# Patient Record
Sex: MALE | Race: WHITE | NOT HISPANIC OR LATINO | URBAN - METROPOLITAN AREA
[De-identification: names, ages, dates, MRNs, and addresses within clinical notes are randomized per-mention and may not be internally consistent; named-entity substitution may affect disease eponyms.]

---

## 2023-12-13 DIAGNOSIS — I10 PRIMARY HYPERTENSION: ICD-10-CM

## 2023-12-13 DIAGNOSIS — E78.00 HYPERCHOLESTEROLEMIA: ICD-10-CM

## 2023-12-13 PROBLEM — K21.9 GERD (GASTROESOPHAGEAL REFLUX DISEASE): Status: ACTIVE | Noted: 2023-12-13

## 2023-12-13 PROBLEM — M25.569 KNEE PAIN: Status: ACTIVE | Noted: 2023-12-13

## 2023-12-13 PROBLEM — R21 RASH: Status: ACTIVE | Noted: 2023-12-13

## 2023-12-13 PROBLEM — I25.10 CORONARY ARTERIOSCLEROSIS: Status: ACTIVE | Noted: 2023-12-13

## 2023-12-13 PROBLEM — E55.9 VITAMIN D DEFICIENCY: Status: ACTIVE | Noted: 2023-12-13

## 2023-12-13 PROBLEM — R93.1 ABNORMAL HEART SCORE CT: Status: ACTIVE | Noted: 2023-12-13

## 2023-12-13 RX ORDER — ASPIRIN 81 MG/1
1 TABLET ORAL DAILY
COMMUNITY
Start: 2022-04-13

## 2023-12-13 RX ORDER — EVOLOCUMAB 140 MG/ML
INJECTION, SOLUTION SUBCUTANEOUS
COMMUNITY
Start: 2022-04-13 | End: 2024-01-10 | Stop reason: WASHOUT

## 2023-12-13 RX ORDER — CHOLECALCIFEROL (VITAMIN D3) 125 MCG
1 CAPSULE ORAL DAILY
COMMUNITY
Start: 2018-12-20 | End: 2024-01-10 | Stop reason: WASHOUT

## 2023-12-13 RX ORDER — OMEPRAZOLE 20 MG/1
1 CAPSULE, DELAYED RELEASE ORAL DAILY
COMMUNITY
Start: 2014-11-18 | End: 2024-01-10 | Stop reason: WASHOUT

## 2023-12-13 RX ORDER — ROSUVASTATIN CALCIUM 40 MG/1
40 TABLET, COATED ORAL DAILY
COMMUNITY

## 2023-12-30 ENCOUNTER — LAB (OUTPATIENT)
Dept: LAB | Facility: LAB | Age: 63
End: 2023-12-30
Payer: MEDICARE

## 2023-12-30 DIAGNOSIS — E78.00 HYPERCHOLESTEROLEMIA: ICD-10-CM

## 2023-12-30 DIAGNOSIS — I10 PRIMARY HYPERTENSION: ICD-10-CM

## 2023-12-30 LAB
ALBUMIN SERPL BCP-MCNC: 4.5 G/DL (ref 3.4–5)
ALP SERPL-CCNC: 93 U/L (ref 33–136)
ALT SERPL W P-5'-P-CCNC: 17 U/L (ref 10–52)
ANION GAP SERPL CALC-SCNC: 11 MMOL/L (ref 10–20)
APPEARANCE UR: ABNORMAL
AST SERPL W P-5'-P-CCNC: 15 U/L (ref 9–39)
BASOPHILS # BLD AUTO: 0.03 X10*3/UL (ref 0–0.1)
BASOPHILS NFR BLD AUTO: 0.5 %
BILIRUB SERPL-MCNC: 0.5 MG/DL (ref 0–1.2)
BILIRUB UR STRIP.AUTO-MCNC: NEGATIVE MG/DL
BUN SERPL-MCNC: 18 MG/DL (ref 6–23)
CALCIUM SERPL-MCNC: 10 MG/DL (ref 8.6–10.6)
CHLORIDE SERPL-SCNC: 105 MMOL/L (ref 98–107)
CHOLEST SERPL-MCNC: 193 MG/DL (ref 0–199)
CHOLESTEROL/HDL RATIO: 3.4
CO2 SERPL-SCNC: 30 MMOL/L (ref 21–32)
COLOR UR: ABNORMAL
CREAT SERPL-MCNC: 1.2 MG/DL (ref 0.5–1.3)
EOSINOPHIL # BLD AUTO: 0.12 X10*3/UL (ref 0–0.7)
EOSINOPHIL NFR BLD AUTO: 1.8 %
ERYTHROCYTE [DISTWIDTH] IN BLOOD BY AUTOMATED COUNT: 12.7 % (ref 11.5–14.5)
GFR SERPL CREATININE-BSD FRML MDRD: 67 ML/MIN/1.73M*2
GLUCOSE SERPL-MCNC: 100 MG/DL (ref 74–99)
GLUCOSE UR STRIP.AUTO-MCNC: NEGATIVE MG/DL
HCT VFR BLD AUTO: 45.5 % (ref 41–52)
HDLC SERPL-MCNC: 56.4 MG/DL
HGB BLD-MCNC: 15.1 G/DL (ref 13.5–17.5)
IMM GRANULOCYTES # BLD AUTO: 0.01 X10*3/UL (ref 0–0.7)
IMM GRANULOCYTES NFR BLD AUTO: 0.2 % (ref 0–0.9)
KETONES UR STRIP.AUTO-MCNC: NEGATIVE MG/DL
LDLC SERPL CALC-MCNC: 100 MG/DL
LEUKOCYTE ESTERASE UR QL STRIP.AUTO: NEGATIVE
LYMPHOCYTES # BLD AUTO: 1.33 X10*3/UL (ref 1.2–4.8)
LYMPHOCYTES NFR BLD AUTO: 20.2 %
MCH RBC QN AUTO: 29.7 PG (ref 26–34)
MCHC RBC AUTO-ENTMCNC: 33.2 G/DL (ref 32–36)
MCV RBC AUTO: 89 FL (ref 80–100)
MONOCYTES # BLD AUTO: 0.61 X10*3/UL (ref 0.1–1)
MONOCYTES NFR BLD AUTO: 9.3 %
NEUTROPHILS # BLD AUTO: 4.47 X10*3/UL (ref 1.2–7.7)
NEUTROPHILS NFR BLD AUTO: 68 %
NITRITE UR QL STRIP.AUTO: NEGATIVE
NON HDL CHOLESTEROL: 137 MG/DL (ref 0–149)
NRBC BLD-RTO: 0 /100 WBCS (ref 0–0)
PH UR STRIP.AUTO: 5 [PH]
PLATELET # BLD AUTO: 219 X10*3/UL (ref 150–450)
POTASSIUM SERPL-SCNC: 4.4 MMOL/L (ref 3.5–5.3)
PROT SERPL-MCNC: 7.4 G/DL (ref 6.4–8.2)
PROT UR STRIP.AUTO-MCNC: NEGATIVE MG/DL
RBC # BLD AUTO: 5.09 X10*6/UL (ref 4.5–5.9)
RBC # UR STRIP.AUTO: NEGATIVE /UL
SODIUM SERPL-SCNC: 142 MMOL/L (ref 136–145)
SP GR UR STRIP.AUTO: 1.02
TRIGL SERPL-MCNC: 184 MG/DL (ref 0–149)
TSH SERPL-ACNC: 1.07 MIU/L (ref 0.44–3.98)
UROBILINOGEN UR STRIP.AUTO-MCNC: <2 MG/DL
VLDL: 37 MG/DL (ref 0–40)
WBC # BLD AUTO: 6.6 X10*3/UL (ref 4.4–11.3)

## 2023-12-30 PROCEDURE — 36415 COLL VENOUS BLD VENIPUNCTURE: CPT

## 2023-12-30 PROCEDURE — 80061 LIPID PANEL: CPT

## 2023-12-30 PROCEDURE — 81003 URINALYSIS AUTO W/O SCOPE: CPT

## 2023-12-30 PROCEDURE — 85025 COMPLETE CBC W/AUTO DIFF WBC: CPT

## 2023-12-30 PROCEDURE — 84443 ASSAY THYROID STIM HORMONE: CPT

## 2023-12-30 PROCEDURE — 80053 COMPREHEN METABOLIC PANEL: CPT

## 2024-01-02 ENCOUNTER — TELEPHONE (OUTPATIENT)
Dept: PRIMARY CARE | Facility: CLINIC | Age: 64
End: 2024-01-02
Payer: MEDICARE

## 2024-01-10 ENCOUNTER — OFFICE VISIT (OUTPATIENT)
Dept: PRIMARY CARE | Facility: CLINIC | Age: 64
End: 2024-01-10
Payer: MEDICARE

## 2024-01-10 VITALS
HEIGHT: 69 IN | SYSTOLIC BLOOD PRESSURE: 174 MMHG | DIASTOLIC BLOOD PRESSURE: 84 MMHG | WEIGHT: 211 LBS | BODY MASS INDEX: 31.25 KG/M2

## 2024-01-10 DIAGNOSIS — I10 PRIMARY HYPERTENSION: ICD-10-CM

## 2024-01-10 DIAGNOSIS — Z12.5 ENCOUNTER FOR PROSTATE CANCER SCREENING: ICD-10-CM

## 2024-01-10 DIAGNOSIS — D49.2 SKIN GROWTH: ICD-10-CM

## 2024-01-10 DIAGNOSIS — R07.9 CHEST PAIN, UNSPECIFIED TYPE: Primary | ICD-10-CM

## 2024-01-10 DIAGNOSIS — R94.31 ABNORMAL EKG: ICD-10-CM

## 2024-01-10 PROCEDURE — 3079F DIAST BP 80-89 MM HG: CPT | Performed by: INTERNAL MEDICINE

## 2024-01-10 PROCEDURE — 93000 ELECTROCARDIOGRAM COMPLETE: CPT | Performed by: INTERNAL MEDICINE

## 2024-01-10 PROCEDURE — 3077F SYST BP >= 140 MM HG: CPT | Performed by: INTERNAL MEDICINE

## 2024-01-10 PROCEDURE — 99214 OFFICE O/P EST MOD 30 MIN: CPT | Performed by: INTERNAL MEDICINE

## 2024-01-10 PROCEDURE — 1126F AMNT PAIN NOTED NONE PRSNT: CPT | Performed by: INTERNAL MEDICINE

## 2024-01-10 PROCEDURE — 1159F MED LIST DOCD IN RCRD: CPT | Performed by: INTERNAL MEDICINE

## 2024-01-10 RX ORDER — METOPROLOL SUCCINATE 25 MG/1
25 TABLET, EXTENDED RELEASE ORAL DAILY
Qty: 30 TABLET | Refills: 0 | Status: SHIPPED | OUTPATIENT
Start: 2024-01-10 | End: 2024-02-07 | Stop reason: SDUPTHER

## 2024-01-10 RX ORDER — LOSARTAN POTASSIUM 50 MG/1
50 TABLET ORAL DAILY
Qty: 30 TABLET | Refills: 0 | Status: SHIPPED | OUTPATIENT
Start: 2024-01-10 | End: 2024-02-07 | Stop reason: SDUPTHER

## 2024-01-11 NOTE — PROGRESS NOTES
"Subjective   Patient ID: Jerrell Ballard is a 63 y.o. male who presents for Follow-up (multiple medical issues).    This gentleman today came here for multiple medical issues.  1. He is under a lot of stress.  He is getting chest pain, dyspnea on exertion, easy fatigability.  Blood pressure is high.  2. He noticed a growth on his scrotal sac, he is worried about.  There is no question of sexually transmitted disease.  He wants to get tested.    I have personally reviewed the patient's Past Medical History, Medications, Allergies, Social History, and Family History in the EMR.    Review of Systems   All other systems reviewed and are negative.    Objective   /84   Ht 1.753 m (5' 9\")   Wt 95.7 kg (211 lb)   BMI 31.16 kg/m²     Physical Exam  Vitals reviewed.   Cardiovascular:      Heart sounds: Normal heart sounds, S1 normal and S2 normal. No murmur heard.     No friction rub.   Pulmonary:      Effort: Pulmonary effort is normal.      Breath sounds: Normal breath sounds and air entry.   Abdominal:      Palpations: There is no hepatomegaly, splenomegaly or mass.   Genitourinary:     Comments: Genital herpes on his scrotal sac, a small pedunculated growth looks like a benign wart.  Musculoskeletal:      Right lower leg: No edema.      Left lower leg: No edema.   Lymphadenopathy:      Lower Body: No right inguinal adenopathy. No left inguinal adenopathy.   Neurological:      Cranial Nerves: Cranial nerves 2-12 are intact.      Sensory: No sensory deficit.      Motor: Motor function is intact.      Deep Tendon Reflexes: Reflexes are normal and symmetric.     LAB WORK:  Laboratory testing discussed.    Assessment/Plan   Problem List Items Addressed This Visit             ICD-10-CM       Cardiac and Vasculature    Hypertension I10    Relevant Medications    metoprolol succinate XL (Toprol-XL) 25 mg 24 hr tablet    losartan (Cozaar) 50 mg tablet    Other Relevant Orders    ECG 12 Lead    Basic metabolic panel    " Referral to Cardiology     Other Visit Diagnoses         Codes    Chest pain, unspecified type    -  Primary R07.9    Abnormal EKG     R94.31    Relevant Orders    Referral to Cardiology    Encounter for prostate cancer screening     Z12.5    Relevant Orders    Prostate Specific Antigen, Screen    Skin growth     D49.2          1. Chest pain, dyspnea on exertion, hypertension, abnormal EKG.  Refer to Cardiology.  He has a normal stress tests 18 months ago.  2. High blood pressure, systolic is very high.  I will start him on metoprolol succinate 25 mg in the morning and losartan 50 mg in the evening.  We will check kidney function, see him in two weeks.  3. Growth.  I have referred him to [facility] to take it out.  4. Abnormal EKG.  Calcium score was low high.  Last year he saw Cardiology.  Refer to Cardiology for possibility of heart checkup and heart catheter in light of blood pressure.  5. High cholesterol, stable.  6. Prostate health.  PSA not done.  We will do next time.  7. The patient just got Medicare.  He is due for Medicare Wellness Visit.  We will try to do it next time.    Scribe Attestation  By signing my name below, IDesi Scribe attest that this documentation has been prepared under the direction and in the presence of Jennifer Randhawa MD.

## 2024-01-27 ENCOUNTER — LAB (OUTPATIENT)
Dept: LAB | Facility: LAB | Age: 64
End: 2024-01-27
Payer: MEDICARE

## 2024-01-27 DIAGNOSIS — Z12.5 ENCOUNTER FOR PROSTATE CANCER SCREENING: ICD-10-CM

## 2024-01-27 DIAGNOSIS — I10 PRIMARY HYPERTENSION: ICD-10-CM

## 2024-01-27 LAB
ANION GAP SERPL CALC-SCNC: 14 MMOL/L (ref 10–20)
BUN SERPL-MCNC: 22 MG/DL (ref 6–23)
CALCIUM SERPL-MCNC: 9.8 MG/DL (ref 8.6–10.6)
CHLORIDE SERPL-SCNC: 103 MMOL/L (ref 98–107)
CO2 SERPL-SCNC: 27 MMOL/L (ref 21–32)
CREAT SERPL-MCNC: 1.14 MG/DL (ref 0.5–1.3)
EGFRCR SERPLBLD CKD-EPI 2021: 71 ML/MIN/1.73M*2
GLUCOSE SERPL-MCNC: 102 MG/DL (ref 74–99)
POTASSIUM SERPL-SCNC: 4.6 MMOL/L (ref 3.5–5.3)
PSA SERPL-MCNC: 0.47 NG/ML
SODIUM SERPL-SCNC: 139 MMOL/L (ref 136–145)

## 2024-01-27 PROCEDURE — G0103 PSA SCREENING: HCPCS

## 2024-01-27 PROCEDURE — 80048 BASIC METABOLIC PNL TOTAL CA: CPT

## 2024-01-27 PROCEDURE — 36415 COLL VENOUS BLD VENIPUNCTURE: CPT

## 2024-02-07 ENCOUNTER — OFFICE VISIT (OUTPATIENT)
Dept: CARDIOLOGY | Facility: CLINIC | Age: 64
End: 2024-02-07
Payer: MEDICARE

## 2024-02-07 ENCOUNTER — HOSPITAL ENCOUNTER (OUTPATIENT)
Dept: CARDIOLOGY | Facility: CLINIC | Age: 64
Discharge: HOME | End: 2024-02-07
Payer: MEDICARE

## 2024-02-07 VITALS
WEIGHT: 208.9 LBS | OXYGEN SATURATION: 97 % | SYSTOLIC BLOOD PRESSURE: 130 MMHG | DIASTOLIC BLOOD PRESSURE: 70 MMHG | HEIGHT: 69 IN | BODY MASS INDEX: 30.94 KG/M2 | HEART RATE: 68 BPM

## 2024-02-07 DIAGNOSIS — I25.10 CORONARY ARTERIOSCLEROSIS: ICD-10-CM

## 2024-02-07 DIAGNOSIS — I10 PRIMARY HYPERTENSION: ICD-10-CM

## 2024-02-07 DIAGNOSIS — E78.00 HYPERCHOLESTEROLEMIA: Primary | ICD-10-CM

## 2024-02-07 PROCEDURE — 99214 OFFICE O/P EST MOD 30 MIN: CPT | Performed by: INTERNAL MEDICINE

## 2024-02-07 PROCEDURE — 1159F MED LIST DOCD IN RCRD: CPT | Performed by: INTERNAL MEDICINE

## 2024-02-07 PROCEDURE — 93005 ELECTROCARDIOGRAM TRACING: CPT

## 2024-02-07 PROCEDURE — 93010 ELECTROCARDIOGRAM REPORT: CPT | Performed by: INTERNAL MEDICINE

## 2024-02-07 PROCEDURE — 3078F DIAST BP <80 MM HG: CPT | Performed by: INTERNAL MEDICINE

## 2024-02-07 PROCEDURE — 3075F SYST BP GE 130 - 139MM HG: CPT | Performed by: INTERNAL MEDICINE

## 2024-02-07 PROCEDURE — 1126F AMNT PAIN NOTED NONE PRSNT: CPT | Performed by: INTERNAL MEDICINE

## 2024-02-07 RX ORDER — EZETIMIBE 10 MG/1
10 TABLET ORAL DAILY
Qty: 90 TABLET | Refills: 3 | Status: SHIPPED | OUTPATIENT
Start: 2024-02-07 | End: 2025-02-06

## 2024-02-07 RX ORDER — METOPROLOL SUCCINATE 50 MG/1
50 TABLET, EXTENDED RELEASE ORAL DAILY
Qty: 90 TABLET | Refills: 3 | Status: SHIPPED | OUTPATIENT
Start: 2024-02-07 | End: 2025-02-01

## 2024-02-07 RX ORDER — LOSARTAN POTASSIUM 50 MG/1
50 TABLET ORAL DAILY
Qty: 90 TABLET | Refills: 3 | Status: SHIPPED | OUTPATIENT
Start: 2024-02-07 | End: 2025-02-06

## 2024-02-07 ASSESSMENT — ENCOUNTER SYMPTOMS
LOSS OF SENSATION IN FEET: 0
OCCASIONAL FEELINGS OF UNSTEADINESS: 0
DEPRESSION: 0

## 2024-02-07 ASSESSMENT — PAIN SCALES - GENERAL: PAINLEVEL: 0-NO PAIN

## 2024-02-08 LAB
ATRIAL RATE: 68 BPM
P AXIS: 25 DEGREES
P OFFSET: 200 MS
P ONSET: 137 MS
PR INTERVAL: 146 MS
Q ONSET: 210 MS
QRS COUNT: 11 BEATS
QRS DURATION: 140 MS
QT INTERVAL: 406 MS
QTC CALCULATION(BAZETT): 431 MS
QTC FREDERICIA: 423 MS
R AXIS: -30 DEGREES
T AXIS: 14 DEGREES
T OFFSET: 413 MS
VENTRICULAR RATE: 68 BPM

## 2024-02-11 NOTE — PROGRESS NOTES
Subjective   Jerrell Ballard is a 63 y.o. male.    Chief Complaint:  New Patient Visit    HPI    ROS    Objective   Physical Exam    Lab Review:   Lab on 01/27/2024   Component Date Value    Glucose 01/27/2024 102 (H)     Sodium 01/27/2024 139     Potassium 01/27/2024 4.6     Chloride 01/27/2024 103     Bicarbonate 01/27/2024 27     Anion Gap 01/27/2024 14     Urea Nitrogen 01/27/2024 22     Creatinine 01/27/2024 1.14     eGFR 01/27/2024 71     Calcium 01/27/2024 9.8     Prostate Specific Antige* 01/27/2024 0.47    Lab on 12/30/2023   Component Date Value    Glucose 12/30/2023 100 (H)     Sodium 12/30/2023 142     Potassium 12/30/2023 4.4     Chloride 12/30/2023 105     Bicarbonate 12/30/2023 30     Anion Gap 12/30/2023 11     Urea Nitrogen 12/30/2023 18     Creatinine 12/30/2023 1.20     eGFR 12/30/2023 67     Calcium 12/30/2023 10.0     Albumin 12/30/2023 4.5     Alkaline Phosphatase 12/30/2023 93     Total Protein 12/30/2023 7.4     AST 12/30/2023 15     Bilirubin, Total 12/30/2023 0.5     ALT 12/30/2023 17     WBC 12/30/2023 6.6     nRBC 12/30/2023 0.0     RBC 12/30/2023 5.09     Hemoglobin 12/30/2023 15.1     Hematocrit 12/30/2023 45.5     MCV 12/30/2023 89     MCH 12/30/2023 29.7     MCHC 12/30/2023 33.2     RDW 12/30/2023 12.7     Platelets 12/30/2023 219     Neutrophils % 12/30/2023 68.0     Immature Granulocytes %,* 12/30/2023 0.2     Lymphocytes % 12/30/2023 20.2     Monocytes % 12/30/2023 9.3     Eosinophils % 12/30/2023 1.8     Basophils % 12/30/2023 0.5     Neutrophils Absolute 12/30/2023 4.47     Immature Granulocytes Ab* 12/30/2023 0.01     Lymphocytes Absolute 12/30/2023 1.33     Monocytes Absolute 12/30/2023 0.61     Eosinophils Absolute 12/30/2023 0.12     Basophils Absolute 12/30/2023 0.03     Color, Urine 12/30/2023 Red (N)     Appearance, Urine 12/30/2023 Hazy (N)     Specific Gravity, Urine 12/30/2023 1.019     pH, Urine 12/30/2023 5.0     Protein, Urine 12/30/2023 NEGATIVE     Glucose, Urine  12/30/2023 NEGATIVE     Blood, Urine 12/30/2023 NEGATIVE     Ketones, Urine 12/30/2023 NEGATIVE     Bilirubin, Urine 12/30/2023 NEGATIVE     Urobilinogen, Urine 12/30/2023 <2.0     Nitrite, Urine 12/30/2023 NEGATIVE     Leukocyte Esterase, Urine 12/30/2023 NEGATIVE     Thyroid Stimulating Horm* 12/30/2023 1.07     Cholesterol 12/30/2023 193     HDL-Cholesterol 12/30/2023 56.4     Cholesterol/HDL Ratio 12/30/2023 3.4     LDL Calculated 12/30/2023 100 (H)     VLDL 12/30/2023 37     Triglycerides 12/30/2023 184 (H)     Non HDL Cholesterol 12/30/2023 137        Assessment/Plan   The primary encounter diagnosis was Hypercholesterolemia. Diagnoses of Coronary arteriosclerosis and Primary hypertension were also pertinent to this visit.    1.  Coronary artery disease.  This patient had a CT coronary calcium score of 377 when performed on 2/3/2022.  A subsequent stress echocardiogram was performed on 6/3/2022 which was unremarkable.  He is clinically asymptomatic.  EKG today's demonstrates sinus rhythm left axis deviation right bundle branch block conduction delay.  Patient will return in 9 months.  His metoprolol ER will be increased from 25 to 50 mg daily and he will remain on losartan 50 mg daily.    2.  Marked hyperlipidemia.  This patient had a total cholesterol 335 with an LDL of 239 when checked 5 years ago.  More recent lipid panel on 12/30/2023 on rosuvastatin 40 mg daily included cholesterol 193  HDL 56.  Will add Zetia 10 mg daily to the rosuvastatin 40 mg daily.    3.  Right bundle branch block conduction delay.

## 2024-02-12 ENCOUNTER — OFFICE VISIT (OUTPATIENT)
Dept: PRIMARY CARE | Facility: CLINIC | Age: 64
End: 2024-02-12
Payer: MEDICARE

## 2024-02-12 VITALS
WEIGHT: 210 LBS | DIASTOLIC BLOOD PRESSURE: 72 MMHG | HEIGHT: 69 IN | BODY MASS INDEX: 31.1 KG/M2 | SYSTOLIC BLOOD PRESSURE: 136 MMHG

## 2024-02-12 DIAGNOSIS — E78.00 HIGH CHOLESTEROL: ICD-10-CM

## 2024-02-12 DIAGNOSIS — R00.2 PALPITATION: Primary | ICD-10-CM

## 2024-02-12 DIAGNOSIS — Z23 NEED FOR PNEUMOCOCCAL VACCINE: ICD-10-CM

## 2024-02-12 DIAGNOSIS — Z13.9 ENCOUNTER FOR SCREENING: ICD-10-CM

## 2024-02-12 DIAGNOSIS — I10 HYPERTENSION, UNSPECIFIED TYPE: ICD-10-CM

## 2024-02-12 PROCEDURE — 1126F AMNT PAIN NOTED NONE PRSNT: CPT | Performed by: INTERNAL MEDICINE

## 2024-02-12 PROCEDURE — G0402 INITIAL PREVENTIVE EXAM: HCPCS | Performed by: INTERNAL MEDICINE

## 2024-02-12 PROCEDURE — 99213 OFFICE O/P EST LOW 20 MIN: CPT | Performed by: INTERNAL MEDICINE

## 2024-02-12 PROCEDURE — 1159F MED LIST DOCD IN RCRD: CPT | Performed by: INTERNAL MEDICINE

## 2024-02-12 PROCEDURE — 1160F RVW MEDS BY RX/DR IN RCRD: CPT | Performed by: INTERNAL MEDICINE

## 2024-02-12 PROCEDURE — 1170F FXNL STATUS ASSESSED: CPT | Performed by: INTERNAL MEDICINE

## 2024-02-12 PROCEDURE — 3075F SYST BP GE 130 - 139MM HG: CPT | Performed by: INTERNAL MEDICINE

## 2024-02-12 PROCEDURE — 3078F DIAST BP <80 MM HG: CPT | Performed by: INTERNAL MEDICINE

## 2024-02-12 ASSESSMENT — ACTIVITIES OF DAILY LIVING (ADL)
BATHING: INDEPENDENT
MANAGING_FINANCES: INDEPENDENT
GROCERY_SHOPPING: INDEPENDENT
TAKING_MEDICATION: INDEPENDENT
DRESSING: INDEPENDENT
DOING_HOUSEWORK: INDEPENDENT

## 2024-02-12 ASSESSMENT — ENCOUNTER SYMPTOMS
DEPRESSION: 0
OCCASIONAL FEELINGS OF UNSTEADINESS: 0
LOSS OF SENSATION IN FEET: 0

## 2024-02-13 NOTE — PROGRESS NOTES
"Subjective   Patient ID: Jerrell Ballard is a 63 y.o. male who presents for Welcome To Medicare and Follow-up (blood work and cardiac).    Mr. Ballard today came here.  1. Welcome to Medicare exam.  2. Follow-up on blood work and cardiac.  He saw cardiologist, put him on metoprolol, it did help him.  Overall, okay.  No problem.    IMMUNIZATION:  He has pneumonia and shingles shot.    I have personally reviewed the patient's Past Medical History, Medications, Allergies, Social History, and Family History in the EMR.    Review of Systems   All other systems reviewed and are negative.  The patient has never had a stroke.  No heart attack.  No diabetes.  No cancer.    Objective   /72   Ht 1.753 m (5' 9\")   Wt 95.3 kg (210 lb)   BMI 31.01 kg/m²     Physical Exam  Vitals reviewed.   HENT:      Right Ear: Tympanic membrane, ear canal and external ear normal.      Left Ear: Tympanic membrane, ear canal and external ear normal.   Eyes:      General: No scleral icterus.     Pupils: Pupils are equal, round, and reactive to light.   Neck:      Vascular: No carotid bruit.   Cardiovascular:      Heart sounds: Normal heart sounds, S1 normal and S2 normal. No murmur heard.     No friction rub.   Pulmonary:      Effort: Pulmonary effort is normal.      Breath sounds: Normal breath sounds and air entry.   Abdominal:      Palpations: There is no hepatomegaly, splenomegaly or mass.   Genitourinary:     Prostate: Normal.   Musculoskeletal:         General: No swelling or deformity. Normal range of motion.      Cervical back: Neck supple.      Right lower leg: No edema.      Left lower leg: No edema.   Lymphadenopathy:      Cervical: No cervical adenopathy.      Upper Body:      Right upper body: No axillary adenopathy.      Left upper body: No axillary adenopathy.      Lower Body: No right inguinal adenopathy. No left inguinal adenopathy.   Neurological:      Mental Status: He is oriented to person, place, and time.      Cranial " Nerves: Cranial nerves 2-12 are intact. No cranial nerve deficit.      Sensory: No sensory deficit.      Motor: Motor function is intact. No weakness.      Gait: Gait is intact.      Deep Tendon Reflexes: Reflexes normal.   Psychiatric:         Mood and Affect: Mood normal. Mood is not anxious or depressed. Affect is not angry.         Behavior: Behavior is not agitated.         Thought Content: Thought content normal.         Judgment: Judgment normal.     LAB WORK:  Laboratory testing discussed.    Assessment/Plan   Problem List Items Addressed This Visit             ICD-10-CM       Cardiac and Vasculature    Hypertension I10     Other Visit Diagnoses         Codes    Palpitation    -  Primary R00.2    Encounter for screening     Z13.9    Relevant Orders    Vascular US abdominal aorta anuerysm AAA screening    High cholesterol     E78.00    Need for pneumococcal vaccine     Z23        1. Welcome to Medicare exam done.  2. AAA screening ordered.  3. The patient is full code. The patient is not depressed, no suicidal.  4. Wife is a power of .  5. Palpitation.  Monitor EKG.  Metoprolol helping.  6. Hypertension, okay.  7. High cholesterol, stable.  Monitor.  8. I shall see him for routine check in three months.  9. Pneumonia shot given.  Shingles shot arranged.    Scribe Attestation  By signing my name below, I, Desi Rojas, Scribe attest that this documentation has been prepared under the direction and in the presence of Jennifer Randhawa MD.

## 2024-02-15 ENCOUNTER — APPOINTMENT (OUTPATIENT)
Dept: VASCULAR MEDICINE | Facility: CLINIC | Age: 64
End: 2024-02-15
Payer: MEDICARE

## 2024-02-19 ENCOUNTER — HOSPITAL ENCOUNTER (OUTPATIENT)
Dept: VASCULAR MEDICINE | Facility: CLINIC | Age: 64
Discharge: HOME | End: 2024-02-19
Payer: MEDICARE

## 2024-02-19 DIAGNOSIS — Z13.6 ENCOUNTER FOR SCREENING FOR CARDIOVASCULAR DISORDERS: ICD-10-CM

## 2024-02-19 DIAGNOSIS — Z13.9 ENCOUNTER FOR SCREENING: ICD-10-CM

## 2024-02-19 PROCEDURE — 76706 US ABDL AORTA SCREEN AAA: CPT

## 2024-02-19 PROCEDURE — 76706 US ABDL AORTA SCREEN AAA: CPT | Performed by: SURGERY

## 2024-04-02 ENCOUNTER — APPOINTMENT (OUTPATIENT)
Dept: CARDIOLOGY | Facility: CLINIC | Age: 64
End: 2024-04-02
Payer: MEDICARE

## 2024-08-28 DIAGNOSIS — E78.00 HYPERCHOLESTEROLEMIA: Primary | ICD-10-CM

## 2024-08-28 RX ORDER — ROSUVASTATIN CALCIUM 40 MG/1
40 TABLET, COATED ORAL DAILY
Qty: 90 TABLET | Refills: 3 | Status: SHIPPED | OUTPATIENT
Start: 2024-08-28 | End: 2025-08-28

## 2024-11-12 ENCOUNTER — OFFICE VISIT (OUTPATIENT)
Dept: CARDIOLOGY | Facility: CLINIC | Age: 64
End: 2024-11-12
Payer: MEDICARE

## 2024-11-12 VITALS
OXYGEN SATURATION: 100 % | WEIGHT: 213.2 LBS | HEART RATE: 64 BPM | BODY MASS INDEX: 31.58 KG/M2 | DIASTOLIC BLOOD PRESSURE: 64 MMHG | SYSTOLIC BLOOD PRESSURE: 128 MMHG | HEIGHT: 69 IN

## 2024-11-12 DIAGNOSIS — E78.00 HYPERCHOLESTEROLEMIA: ICD-10-CM

## 2024-11-12 PROCEDURE — 1126F AMNT PAIN NOTED NONE PRSNT: CPT | Performed by: INTERNAL MEDICINE

## 2024-11-12 PROCEDURE — 99214 OFFICE O/P EST MOD 30 MIN: CPT | Performed by: INTERNAL MEDICINE

## 2024-11-12 PROCEDURE — 3074F SYST BP LT 130 MM HG: CPT | Performed by: INTERNAL MEDICINE

## 2024-11-12 PROCEDURE — 1159F MED LIST DOCD IN RCRD: CPT | Performed by: INTERNAL MEDICINE

## 2024-11-12 PROCEDURE — 3078F DIAST BP <80 MM HG: CPT | Performed by: INTERNAL MEDICINE

## 2024-11-12 PROCEDURE — 3008F BODY MASS INDEX DOCD: CPT | Performed by: INTERNAL MEDICINE

## 2024-11-12 RX ORDER — EZETIMIBE 10 MG/1
10 TABLET ORAL EVERY EVENING
Qty: 90 TABLET | Refills: 3 | Status: SHIPPED | OUTPATIENT
Start: 2024-11-12 | End: 2025-11-12

## 2024-11-12 ASSESSMENT — ENCOUNTER SYMPTOMS
DEPRESSION: 0
LOSS OF SENSATION IN FEET: 0

## 2024-11-12 ASSESSMENT — PAIN SCALES - GENERAL: PAINLEVEL_OUTOF10: 0-NO PAIN

## 2024-11-12 NOTE — PATIENT INSTRUCTIONS
restart Ezetimibe 10 mg (1 tab) by mouth every evening (sent to [facility])  Lab work -Lipid panel in March 2025  Follow up  6 months

## 2024-11-12 NOTE — PROGRESS NOTES
"Primary Care Physician: Jennifer Randhawa MD  Date of Visit: 11/12/2024  4:30 PM EST  Location of visit: [facility]     Chief Complaint:   Chief Complaint   Patient presents with    Hypertension    Follow-up    Coronary Artery Disease     HPI / Summary:   Jerrell Ballard is a 63 y.o. male presents for follow up    ROS    Medical History:   He has a past medical history of Arthritis, BPH (benign prostatic hyperplasia), High cholesterol, Hypertension, and Macular degeneration.  Surgical Hx:   He has no past surgical history on file.   Social Hx:   He reports that he has been smoking cigarettes. He has never used smokeless tobacco. He reports that he does not drink alcohol. No history on file for drug use.  Family Hx:   His family history includes No Known Problems in his father and mother.   Allergies:  No Known Allergies  Outpatient Medications:  Current Outpatient Medications   Medication Instructions    aspirin 81 mg EC tablet 1 tablet, Daily    ezetimibe (ZETIA) 10 mg, oral, Daily    losartan (COZAAR) 50 mg, oral, Daily    metoprolol succinate XL (TOPROL-XL) 50 mg, oral, Daily, Do not crush or chew.    rosuvastatin (CRESTOR) 40 mg, oral, Daily     Physical Exam:  Vitals:    11/12/24 1627   BP: 156/78   BP Location: Right arm   Patient Position: Sitting   BP Cuff Size: Large adult long   Pulse: 88   SpO2: 100%   Weight: 96.7 kg (213 lb 3.2 oz)   Height: 1.753 m (5' 9\")     Wt Readings from Last 5 Encounters:   11/12/24 96.7 kg (213 lb 3.2 oz)   02/12/24 95.3 kg (210 lb)   02/07/24 94.8 kg (208 lb 14.4 oz)   01/10/24 95.7 kg (211 lb)   04/13/22 93.2 kg (205 lb 6 oz)     Physical Exam  JVP not elevated. Carotid impulses are 2+ without overlying bruit.   Chest exhibits fair to good air movement with completely clear breath sounds.   The cardiac rhythm is regular with no premature beats.   Normal S1 and S2. No gallop, murmur or rub, or click.   Abdomen is soft and benign without focal tenderness.   With " no lower leg edema. The pedal pulses are intact.     Last Labs:  No visits with results within 6 Month(s) from this visit.   Latest known visit with results is:   Office Visit on 02/07/2024   Component Date Value    Ventricular Rate 02/08/2024 68     Atrial Rate 02/08/2024 68     NE Interval 02/08/2024 146     QRS Duration 02/08/2024 140     QT Interval 02/08/2024 406     QTC Calculation(Bazett) 02/08/2024 431     P Axis 02/08/2024 25     R Axis 02/08/2024 -30     T [place] 02/08/2024 14     QRS Count 02/08/2024 11     Q Onset 02/08/2024 210     P Onset 02/08/2024 137     P Offset 02/08/2024 200     T Offset 02/08/2024 413     QTC [place] 02/08/2024 423         Assessment/Plan   The primary encounter diagnosis was Hypercholesterolemia. Diagnoses of Coronary arteriosclerosis and Primary hypertension were also pertinent to this visit.     1.  Coronary artery disease.  This patient had a CT coronary calcium score of 377 when performed on 2/3/2022.  A subsequent stress echocardiogram was performed on 6/3/2022 which was unremarkable.  He is clinically asymptomatic.  EKG today's demonstrates sinus rhythm left axis deviation right bundle branch block conduction delay.  Patient will return in 9 months.  His metoprolol ER will be increased from 25 to 50 mg daily and he will remain on losartan 50 mg daily.     2.  Marked hyperlipidemia.  This patient had a total cholesterol 335 with an LDL of 239 when checked 12/18/2018.  More recent lipid panel on 12/30/2023 on rosuvastatin 40 mg daily included cholesterol 193  HDL 56.  Will add Zetia 10 mg daily to the rosuvastatin 40 mg daily.  Patient will have a repeat fasting lipid panel drawn before next visit in 6 months.  If LDL readings are not satisfactory we will start Repatha in place of the ezetimibe and also maintain the rosuvastatin 40 mg daily.     3.  Right bundle branch block conduction delay.          Orders:  No orders of the defined types were placed in this  encounter.     Followup Appts:  No future appointments.        ____________________________________________________________  MD [facility]  Assistant Clinical Professor, [employer] [facility]

## 2025-04-16 DIAGNOSIS — I10 PRIMARY HYPERTENSION: Primary | ICD-10-CM

## 2025-04-16 RX ORDER — LOSARTAN POTASSIUM 50 MG/1
50 TABLET ORAL DAILY
Qty: 90 TABLET | Refills: 3 | Status: SHIPPED | OUTPATIENT
Start: 2025-04-16 | End: 2026-04-16

## 2025-04-16 RX ORDER — METOPROLOL SUCCINATE 50 MG/1
50 TABLET, EXTENDED RELEASE ORAL DAILY
Qty: 90 TABLET | Refills: 3 | Status: SHIPPED | OUTPATIENT
Start: 2025-04-16 | End: 2026-04-11

## 2025-05-18 LAB
CHOLEST SERPL-MCNC: 194 MG/DL
CHOLEST/HDLC SERPL: 3.7 (CALC)
HDLC SERPL-MCNC: 53 MG/DL
LDLC SERPL CALC-MCNC: 111 MG/DL (CALC)
NONHDLC SERPL-MCNC: 141 MG/DL (CALC)
TRIGL SERPL-MCNC: 180 MG/DL